# Patient Record
Sex: FEMALE | Race: WHITE | Employment: FULL TIME | URBAN - METROPOLITAN AREA
[De-identification: names, ages, dates, MRNs, and addresses within clinical notes are randomized per-mention and may not be internally consistent; named-entity substitution may affect disease eponyms.]

---

## 2018-04-02 ENCOUNTER — APPOINTMENT (OUTPATIENT)
Dept: GENERAL RADIOLOGY | Age: 58
End: 2018-04-02
Payer: COMMERCIAL

## 2018-04-02 ENCOUNTER — HOSPITAL ENCOUNTER (EMERGENCY)
Age: 58
Discharge: HOME | End: 2018-04-02
Attending: EMERGENCY MEDICINE
Payer: COMMERCIAL

## 2018-04-02 VITALS
TEMPERATURE: 98.2 F | RESPIRATION RATE: 16 BRPM | BODY MASS INDEX: 34.36 KG/M2 | HEART RATE: 105 BPM | SYSTOLIC BLOOD PRESSURE: 133 MMHG | OXYGEN SATURATION: 95 % | DIASTOLIC BLOOD PRESSURE: 77 MMHG | WEIGHT: 240 LBS | HEIGHT: 70 IN

## 2018-04-02 DIAGNOSIS — I15.9 SECONDARY HYPERTENSION: Primary | ICD-10-CM

## 2018-04-02 LAB
ANION GAP SERPL CALCULATED.3IONS-SCNC: 10 MMOL/L (ref 7–16)
BASOPHILS ABSOLUTE: 0.04 E9/L (ref 0–0.2)
BASOPHILS RELATIVE PERCENT: 0.5 % (ref 0–2)
BUN BLDV-MCNC: 15 MG/DL (ref 6–20)
CALCIUM SERPL-MCNC: 9.8 MG/DL (ref 8.6–10.2)
CHLORIDE BLD-SCNC: 102 MMOL/L (ref 98–107)
CO2: 28 MMOL/L (ref 22–29)
CREAT SERPL-MCNC: 0.8 MG/DL (ref 0.5–1)
EKG ATRIAL RATE: 71 BPM
EKG P AXIS: 49 DEGREES
EKG P-R INTERVAL: 146 MS
EKG Q-T INTERVAL: 388 MS
EKG QRS DURATION: 92 MS
EKG QTC CALCULATION (BAZETT): 421 MS
EKG R AXIS: -14 DEGREES
EKG T AXIS: 13 DEGREES
EKG VENTRICULAR RATE: 71 BPM
EOSINOPHILS ABSOLUTE: 0.21 E9/L (ref 0.05–0.5)
EOSINOPHILS RELATIVE PERCENT: 2.7 % (ref 0–6)
GFR AFRICAN AMERICAN: >60
GFR NON-AFRICAN AMERICAN: >60 ML/MIN/1.73
GLUCOSE BLD-MCNC: 121 MG/DL (ref 74–109)
HCT VFR BLD CALC: 42.5 % (ref 34–48)
HEMOGLOBIN: 13.8 G/DL (ref 11.5–15.5)
IMMATURE GRANULOCYTES #: 0.02 E9/L
IMMATURE GRANULOCYTES %: 0.3 % (ref 0–5)
LYMPHOCYTES ABSOLUTE: 1.52 E9/L (ref 1.5–4)
LYMPHOCYTES RELATIVE PERCENT: 19.7 % (ref 20–42)
MCH RBC QN AUTO: 27.2 PG (ref 26–35)
MCHC RBC AUTO-ENTMCNC: 32.5 % (ref 32–34.5)
MCV RBC AUTO: 83.7 FL (ref 80–99.9)
MONOCYTES ABSOLUTE: 0.39 E9/L (ref 0.1–0.95)
MONOCYTES RELATIVE PERCENT: 5 % (ref 2–12)
NEUTROPHILS ABSOLUTE: 5.55 E9/L (ref 1.8–7.3)
NEUTROPHILS RELATIVE PERCENT: 71.8 % (ref 43–80)
PDW BLD-RTO: 12.7 FL (ref 11.5–15)
PLATELET # BLD: 337 E9/L (ref 130–450)
PMV BLD AUTO: 10.5 FL (ref 7–12)
POTASSIUM SERPL-SCNC: 4 MMOL/L (ref 3.5–5)
RBC # BLD: 5.08 E12/L (ref 3.5–5.5)
SODIUM BLD-SCNC: 140 MMOL/L (ref 132–146)
TROPONIN: <0.01 NG/ML (ref 0–0.03)
WBC # BLD: 7.7 E9/L (ref 4.5–11.5)

## 2018-04-02 PROCEDURE — 84484 ASSAY OF TROPONIN QUANT: CPT

## 2018-04-02 PROCEDURE — 80048 BASIC METABOLIC PNL TOTAL CA: CPT

## 2018-04-02 PROCEDURE — 93005 ELECTROCARDIOGRAM TRACING: CPT | Performed by: NURSE PRACTITIONER

## 2018-04-02 PROCEDURE — 85025 COMPLETE CBC W/AUTO DIFF WBC: CPT

## 2018-04-02 PROCEDURE — 71045 X-RAY EXAM CHEST 1 VIEW: CPT

## 2018-04-02 PROCEDURE — 99284 EMERGENCY DEPT VISIT MOD MDM: CPT

## 2018-04-02 RX ORDER — PROPRANOLOL HYDROCHLORIDE 40 MG/1
40 TABLET ORAL DAILY
COMMUNITY

## 2018-04-02 RX ORDER — VALSARTAN AND HYDROCHLOROTHIAZIDE 80; 12.5 MG/1; MG/1
1 TABLET, FILM COATED ORAL DAILY
COMMUNITY

## 2018-04-02 RX ORDER — HYDRALAZINE HYDROCHLORIDE 20 MG/ML
10 INJECTION INTRAMUSCULAR; INTRAVENOUS ONCE
Status: DISCONTINUED | OUTPATIENT
Start: 2018-04-02 | End: 2018-04-03 | Stop reason: HOSPADM

## 2018-04-02 ASSESSMENT — ENCOUNTER SYMPTOMS
ABDOMINAL PAIN: 0
CHEST TIGHTNESS: 0
EYE PAIN: 0
SORE THROAT: 0
NAUSEA: 0
SHORTNESS OF BREATH: 0
ABDOMINAL DISTENTION: 0
EYE REDNESS: 0
TROUBLE SWALLOWING: 0
PHOTOPHOBIA: 0
DIARRHEA: 0
RHINORRHEA: 1

## 2020-10-23 ENCOUNTER — HOSPITAL ENCOUNTER (OUTPATIENT)
Dept: MAMMOGRAPHY | Age: 60
Discharge: HOME | End: 2020-10-25
Payer: COMMERCIAL

## 2020-10-23 PROCEDURE — 77063 BREAST TOMOSYNTHESIS BI: CPT

## 2021-11-08 ENCOUNTER — HOSPITAL ENCOUNTER (OUTPATIENT)
Dept: MAMMOGRAPHY | Age: 61
Discharge: HOME | End: 2021-11-10
Payer: COMMERCIAL

## 2021-11-08 DIAGNOSIS — Z12.31 ENCOUNTER FOR SCREENING MAMMOGRAM FOR MALIGNANT NEOPLASM OF BREAST: ICD-10-CM

## 2021-11-08 PROCEDURE — 77063 BREAST TOMOSYNTHESIS BI: CPT

## 2021-11-24 ENCOUNTER — HOSPITAL ENCOUNTER (OUTPATIENT)
Dept: GENERAL RADIOLOGY | Age: 61
Discharge: HOME | End: 2021-11-26
Payer: COMMERCIAL

## 2021-11-24 DIAGNOSIS — N64.89 BREAST ASYMMETRY: ICD-10-CM

## 2021-11-24 DIAGNOSIS — R92.8 ABNORMAL MAMMOGRAM: ICD-10-CM

## 2021-11-24 PROCEDURE — 76642 ULTRASOUND BREAST LIMITED: CPT

## 2021-11-24 PROCEDURE — G0279 TOMOSYNTHESIS, MAMMO: HCPCS

## 2022-03-16 ENCOUNTER — CLINICAL DOCUMENTATION (OUTPATIENT)
Dept: GENERAL RADIOLOGY | Age: 62
End: 2022-03-16

## 2022-03-16 NOTE — PROGRESS NOTES
Authorization for breast MRI (CPT 65553) has been obtained from [facility]. Authorization # [id number], valid until 5-8-22.

## 2022-03-17 DIAGNOSIS — Z91.89 AT HIGH RISK FOR BREAST CANCER: Primary | ICD-10-CM

## 2022-03-31 ENCOUNTER — TELEPHONE (OUTPATIENT)
Dept: GENERAL RADIOLOGY | Age: 62
End: 2022-03-31

## 2022-04-08 ENCOUNTER — HOSPITAL ENCOUNTER (OUTPATIENT)
Age: 62
Discharge: HOME | End: 2022-04-08
Payer: COMMERCIAL

## 2022-04-08 DIAGNOSIS — Z91.89 AT HIGH RISK FOR BREAST CANCER: ICD-10-CM

## 2022-04-08 LAB
ANION GAP SERPL CALCULATED.3IONS-SCNC: 13 MMOL/L (ref 7–16)
BUN BLDV-MCNC: 19 MG/DL (ref 6–20)
CALCIUM SERPL-MCNC: 9.2 MG/DL (ref 8.6–10.2)
CHLORIDE BLD-SCNC: 102 MMOL/L (ref 98–107)
CO2: 23 MMOL/L (ref 22–29)
CREAT SERPL-MCNC: 0.9 MG/DL (ref 0.5–1)
GFR AFRICAN AMERICAN: >60
GFR NON-AFRICAN AMERICAN: >60 ML/MIN/1.73
GLUCOSE BLD-MCNC: 114 MG/DL (ref 74–99)
POTASSIUM SERPL-SCNC: 3.9 MMOL/L (ref 3.5–5)
SODIUM BLD-SCNC: 138 MMOL/L (ref 132–146)

## 2022-04-08 PROCEDURE — 36415 COLL VENOUS BLD VENIPUNCTURE: CPT

## 2022-04-08 PROCEDURE — 80048 BASIC METABOLIC PNL TOTAL CA: CPT

## 2022-04-26 ENCOUNTER — HOSPITAL ENCOUNTER (OUTPATIENT)
Dept: MRI IMAGING | Age: 62
Discharge: HOME | End: 2022-04-28
Payer: COMMERCIAL

## 2022-04-26 DIAGNOSIS — Z80.3 FAMILY HISTORY OF MALIGNANT NEOPLASM OF BREAST: ICD-10-CM

## 2022-04-26 PROCEDURE — 6360000004 HC RX CONTRAST MEDICATION: Performed by: RADIOLOGY

## 2022-04-26 PROCEDURE — 77049 MRI BREAST C-+ W/CAD BI: CPT

## 2022-04-26 PROCEDURE — A9585 GADOBUTROL INJECTION: HCPCS | Performed by: RADIOLOGY

## 2022-04-26 RX ADMIN — GADOBUTROL 10 ML: 604.72 INJECTION INTRAVENOUS at 13:04

## 2022-05-11 ENCOUNTER — HOSPITAL ENCOUNTER (OUTPATIENT)
Dept: GENERAL RADIOLOGY | Age: 62
Discharge: HOME | End: 2022-05-13
Payer: COMMERCIAL

## 2022-05-11 ENCOUNTER — HOSPITAL ENCOUNTER (OUTPATIENT)
Dept: MRI IMAGING | Age: 62
Discharge: HOME | End: 2022-05-13
Payer: COMMERCIAL

## 2022-05-11 VITALS — HEIGHT: 70 IN | WEIGHT: 250 LBS | BODY MASS INDEX: 35.79 KG/M2

## 2022-05-11 DIAGNOSIS — Z98.890 STATUS POST BIOPSY: ICD-10-CM

## 2022-05-11 DIAGNOSIS — R92.8 ABNORMAL FINDING ON BREAST IMAGING: ICD-10-CM

## 2022-05-11 PROCEDURE — 88305 TISSUE EXAM BY PATHOLOGIST: CPT

## 2022-05-11 PROCEDURE — 6360000004 HC RX CONTRAST MEDICATION: Performed by: RADIOLOGY

## 2022-05-11 PROCEDURE — 2500000003 HC RX 250 WO HCPCS

## 2022-05-11 PROCEDURE — A9585 GADOBUTROL INJECTION: HCPCS | Performed by: RADIOLOGY

## 2022-05-11 PROCEDURE — 77065 DX MAMMO INCL CAD UNI: CPT

## 2022-05-11 PROCEDURE — 2720000010 MRI BIOPSY BREAST W LOC DEVICE RIGHT 1ST LESION

## 2022-05-11 RX ADMIN — GADOBUTROL 10 ML: 604.72 INJECTION INTRAVENOUS at 13:44

## 2022-05-17 ENCOUNTER — TELEPHONE (OUTPATIENT)
Dept: GENERAL RADIOLOGY | Age: 62
End: 2022-05-17

## 2022-05-17 NOTE — TELEPHONE ENCOUNTER
Per Valeria Protocol, patient was asked prior to biopsy how she would like notified of her breast biopsy results and she elected telephone call from breast navigator. Call to patient today to instruct her that the pathology report from her recent right breast biopsy indicates  Flat epithelial atypia and intraductal papilloma. Instructed that while both are benign, FEA is considered a high risk lesion. Surgical consultation is recommended. Norma Nevarez indicates that she is currently in [place]. She will speak with her physician about whether or not surgical consult can wait until the Fall when she returns to [place]. Instructed to  follow her physician's recommendations for any follow up care. Verbalizes understanding. Pathology report faxed to Dr. Solorio McMinn office.   Electronically signed by Nydia Hudson RN, BSN on 5/17/2022 at 2:36 PM

## 2022-05-19 ENCOUNTER — TELEPHONE (OUTPATIENT)
Dept: BREAST CENTER | Age: 62
End: 2022-05-19

## 2022-05-19 NOTE — TELEPHONE ENCOUNTER
Patient is a new referral to the breast clinic. Left message for patient to return call at [phone] to discuss referral information and schedule an appointment in the breast clinic.       Electronically signed by Storm Aguirre RN on 5/19/22 at 11:18 AM EDT

## 2022-05-24 ENCOUNTER — TELEPHONE (OUTPATIENT)
Dept: BREAST CENTER | Age: 62
End: 2022-05-24

## 2022-05-24 NOTE — TELEPHONE ENCOUNTER
Patient was referred by Krystal Calle for right breast focal flat epithelial atypia intraductal papilloma. Patient was scheduled on 06/14/2022 in [facility] office @ 2:30pm with Dr. Mi Sanches. Patient was instructed to bring a photo ID, insurance card (if applicable), and list of any current medications. Patient verbalized understanding of appointment instructions.         Electronically signed by Thanh Goodrich RN on 5/24/22 at 8:40 AM EDT

## 2022-06-14 ENCOUNTER — OFFICE VISIT (OUTPATIENT)
Dept: BREAST CENTER | Age: 62
End: 2022-06-14
Payer: COMMERCIAL

## 2022-06-14 VITALS
DIASTOLIC BLOOD PRESSURE: 86 MMHG | RESPIRATION RATE: 18 BRPM | HEART RATE: 68 BPM | TEMPERATURE: 97.9 F | WEIGHT: 252.2 LBS | OXYGEN SATURATION: 100 % | SYSTOLIC BLOOD PRESSURE: 158 MMHG | BODY MASS INDEX: 36.11 KG/M2 | HEIGHT: 70 IN

## 2022-06-14 DIAGNOSIS — R92.8 ABNORMAL MRI, BREAST: ICD-10-CM

## 2022-06-14 PROCEDURE — 99203 OFFICE O/P NEW LOW 30 MIN: CPT | Performed by: SURGERY

## 2022-06-14 PROCEDURE — 99203 OFFICE O/P NEW LOW 30 MIN: CPT

## 2022-06-14 RX ORDER — LOSARTAN POTASSIUM AND HYDROCHLOROTHIAZIDE 25; 100 MG/1; MG/1
TABLET ORAL
COMMUNITY
Start: 2022-06-13

## 2022-06-14 RX ORDER — ERGOCALCIFEROL 1.25 MG/1
CAPSULE ORAL
COMMUNITY
Start: 2022-05-02

## 2022-06-14 NOTE — PROGRESS NOTES
Date of Visit: 6/14/2022  New Patient    06/14/22      DIAGNOSIS:  1. (06/14/22) RIGHT (RA) papilloma with FEA  2. Family history of breast cancer  * Mother  * Paternal aunt    IMAGING/PROCEDURES:  1. (11/08/21) BILATERAL s-mammogram: BIRADS-0  * RIGHT (central asymmetry)  2. (11/24/21) RIGHT d-mammogram and US: BIRADS-2  * Asymmetry persists on mammo  * US shows two small hypoechoic areas largest 4mm possible cysts  3. (04/26/22) MRI: BIRADS-4  * RIGHT (RA) 1cm NMLE  4. (05/11/22) RIGHT MRI biopsy    HISTORY OF PRESENT ILLNESS  Miquel Alicia was in the office today for her consultation regarding her recent right breast MRI guided biopsy. Prakash Almonte underwent her routine imaging in late 2021. There were no concerning findings. She was seen by one of her healthcare providers where her family history of breast cancer was noted. It breast cancer risk calculator, the jamie model, was performed and demonstrated a lifetime breast cancer risk of 29%. Based on this the patient underwent a breast MRI. The MRI showed 1 cm of non-mass-like linear enhancement in the right retroareolar location. Image guided biopsy was performed and demonstrated notable findings of flat epithelial atypia as well as a papilloma. The patient is in the office now to discuss the above and receive any additional recommendations. BREAST SYMPTOMS  Prakash Almonte has no symptoms to report. Specifically, she has no palpable masses. She has had no nipple discharge or retraction. She has had no skin retraction or discoloration. PAST BREAST HISTORY  Prakash Almonte has undergone no prior biopsies and has had no breast surgeries. BREAST CANCER RISK FACTORS  The patient does have a family history of breast cancer. Notably, her mother was diagnosed with breast cancer at the age of 61. A paternal aunt was diagnosed in her 76s. She also believes that there is a maternal second cousin who was also diagnosed with breast cancer likely in the postmenopausal years.     PAST MEDICAL/SURGICAL HISTORY  Past Medical History:   Diagnosis Date    HTN (hypertension)      Past Surgical History:   Procedure Laterality Date    MRI BREAST BX USING DEVICE RIGHT Right 5/11/2022    MRI BREAST BX USING DEVICE RIGHT 5/11/2022 [facility] MRI       MEDICATIONS    Current Outpatient Medications:     vitamin D (ERGOCALCIFEROL) 1.25 MG (75593 UT) CAPS capsule, , Disp: , Rfl:     losartan-hydroCHLOROthiazide (HYZAAR) 100-25 MG per tablet, , Disp: , Rfl:     propranolol (INDERAL) 40 MG tablet, Take 40 mg by mouth daily, Disp: , Rfl:     valsartan-hydrochlorothiazide (DIOVAN HCT) 80-12.5 MG per tablet, Take 1 tablet by mouth daily, Disp: , Rfl:     ALLERGIES  Allergies   Allergen Reactions    Amoxicillin        SOCIAL HISTORY   reports that she has never smoked. She has never used smokeless tobacco. She reports that she does not drink alcohol and does not use drugs. PHYSICAL EXAMINATION  BP (!) 158/86 (Site: Right Upper Arm)   Pulse 68   Temp 97.9 °F (36.6 °C)   Resp 18   Ht 5' 10\" (1.778 m)   Wt 252 lb 3.2 oz (114.4 kg)   SpO2 100%   BMI 36.19 kg/m²     COMPREHENSIVE BREAST EXAMINATION  There are no cervical, supraclavicular, or infraclavicular lymph nodes that are palpable. Inspection of the breast bilaterally demonstrate there to be no secondary signs of malignancy. There are no lesions of the nipple areola on either side. No spontaneous discharges with this. Palpation to the axilla bilaterally is without adenopathy. Palpation of the breast demonstrates no masses. BREAST IMAGING STUDIES  I did review the imaging studies. The key findings are from the mammogram and ultrasound in November 2 small benign-appearing hypoechoic retroareolar irregularities. The MRI shows somewhat linear non-mass-like enhancement in the right retroareolar location. PATHOLOGY  I did review the recent biopsy results which demonstrated flat epithelial atypia and a papilloma.     ASSESSMENT AND RONY Aguirre was in the office today for her consultation regarding her recent right breast biopsy. I had a discussion today with Emmanuel Severino regarding the diagnosis as well as recommended treatment options. I did spend 30 minutes on the visit over half of which was dedicated to education counseling and decision making. Some of this time does include previsit chart review. With respect to her case specifically I first assured her that neither the flat epithelial atypia nor the papilloma are malignant nor should they be considered premalignant. We went on to discuss the concept of sampling limitation. I informed her that while image guided biopsy is very accurate there are certain cases where there is some uncertainty. Both the flat epithelial atypia and the papilloma fell into this category. I shared with her that in prior years risk of finding an occult malignancy with a follow-up lumpectomy was as high as 20% with either of these diagnoses. However, more recent studies in the area of better imaging showed that the risk of finding an occult malignancy with flat epithelial atypia is likely in the mid single digits. Likewise for a papilloma without atypia or symptoms. Today in the office therefore we discussed the options of short interval follow-up versus excision. With my full support and in fact encouragement she chose for surveillance. The patient will go undergo diagnostic imaging in October 2022. She will undergo a follow-up breast MRI and see us in the office in April 2023. Because now if the flat epithelial atypia she likely will be committed to annual breast MRI for surveillance. In the interim we certainly asked the patient to perform self examinations and contact us should there be any changes. This note was created with voice recognition software.   Please excuse any grammatical errors that were not corrected and please contact me if there are any questions. [email or web address]  [phone]

## 2022-06-15 DIAGNOSIS — D24.1 PAPILLOMA OF RIGHT BREAST: Primary | ICD-10-CM

## 2022-10-27 ENCOUNTER — HOSPITAL ENCOUNTER (OUTPATIENT)
Dept: GENERAL RADIOLOGY | Age: 62
Discharge: HOME | End: 2022-10-29
Payer: COMMERCIAL

## 2022-10-27 DIAGNOSIS — D24.1 PAPILLOMA OF RIGHT BREAST: ICD-10-CM

## 2022-10-27 PROCEDURE — G0279 TOMOSYNTHESIS, MAMMO: HCPCS

## 2022-10-28 DIAGNOSIS — D24.1 INTRADUCTAL PAPILLOMA OF BREAST, RIGHT: Primary | ICD-10-CM

## 2023-03-02 ENCOUNTER — TELEPHONE (OUTPATIENT)
Dept: BREAST CENTER | Age: 63
End: 2023-03-02

## 2023-03-02 DIAGNOSIS — Z01.818 PREOP TESTING: Primary | ICD-10-CM

## 2023-03-02 NOTE — TELEPHONE ENCOUNTER
RN contacted patient to verify information to move forward with patient MRI. Patient verified that insurance in computer is correct to move forward with authorization. Patient reminded patient to have lab work drawn at [facility] location. RN notified patient that once office receives authorization for MRI to be scheduled order will be given to [address] [place] [address]  and they will contact her to schedule the MRI. Patient notified once MRI scheduled to contact office and schedule follow up appointment. Patient verbalized understanding.            Electronically signed by Jian Lopez RN on 3/2/23 at 10:34 AM EST

## 2023-03-02 NOTE — TELEPHONE ENCOUNTER
----- Message from Melissa Montejo RN sent at 8/17/2022  2:10 PM EDT -----  Patient had diagnostic work up in Oct 2022. Patient needs to have MRI performed then follow up with Sheldon Rico 04/2023. Patient last seen 06/14/2022.     Electronically signed by Melissa Montejo RN on 8/17/22 at 2:11 PM EDT

## 2023-03-31 ENCOUNTER — HOSPITAL ENCOUNTER (OUTPATIENT)
Age: 63
Discharge: HOME | End: 2023-03-31
Payer: COMMERCIAL

## 2023-03-31 DIAGNOSIS — Z01.818 PREOP TESTING: ICD-10-CM

## 2023-03-31 LAB
BUN SERPL-MCNC: 18 MG/DL (ref 6–23)
CREAT SERPL-MCNC: 0.9 MG/DL (ref 0.5–1)

## 2023-03-31 PROCEDURE — 82565 ASSAY OF CREATININE: CPT

## 2023-03-31 PROCEDURE — 84520 ASSAY OF UREA NITROGEN: CPT

## 2023-03-31 PROCEDURE — 36415 COLL VENOUS BLD VENIPUNCTURE: CPT

## 2023-05-09 ENCOUNTER — HOSPITAL ENCOUNTER (OUTPATIENT)
Dept: MRI IMAGING | Age: 63
Discharge: HOME | End: 2023-05-11
Payer: COMMERCIAL

## 2023-05-09 DIAGNOSIS — D24.1 PAPILLOMA OF RIGHT BREAST: ICD-10-CM

## 2023-05-09 PROCEDURE — A9585 GADOBUTROL INJECTION: HCPCS | Performed by: RADIOLOGY

## 2023-05-09 PROCEDURE — 6360000004 HC RX CONTRAST MEDICATION: Performed by: RADIOLOGY

## 2023-05-09 PROCEDURE — C8908 MRI W/O FOL W/CONT, BREAST,: HCPCS

## 2023-05-09 RX ADMIN — GADOBUTROL 10 ML: 604.72 INJECTION INTRAVENOUS at 13:44

## 2023-05-10 ENCOUNTER — OFFICE VISIT (OUTPATIENT)
Dept: BREAST CENTER | Age: 63
End: 2023-05-10
Payer: COMMERCIAL

## 2023-05-10 VITALS
HEART RATE: 61 BPM | BODY MASS INDEX: 37.22 KG/M2 | WEIGHT: 260 LBS | HEIGHT: 70 IN | DIASTOLIC BLOOD PRESSURE: 88 MMHG | SYSTOLIC BLOOD PRESSURE: 136 MMHG | OXYGEN SATURATION: 99 % | RESPIRATION RATE: 14 BRPM | TEMPERATURE: 98.5 F

## 2023-05-10 DIAGNOSIS — D24.1 INTRADUCTAL PAPILLOMA OF BREAST, RIGHT: Primary | ICD-10-CM

## 2023-05-10 PROCEDURE — G8427 DOCREV CUR MEDS BY ELIG CLIN: HCPCS | Performed by: SURGERY

## 2023-05-10 PROCEDURE — 1036F TOBACCO NON-USER: CPT | Performed by: SURGERY

## 2023-05-10 PROCEDURE — 99213 OFFICE O/P EST LOW 20 MIN: CPT | Performed by: SURGERY

## 2023-05-10 PROCEDURE — 3017F COLORECTAL CA SCREEN DOC REV: CPT | Performed by: SURGERY

## 2023-05-10 PROCEDURE — G8417 CALC BMI ABV UP PARAM F/U: HCPCS | Performed by: SURGERY

## 2023-05-10 NOTE — PROGRESS NOTES
Date of visit: 5/10/2023    06/14/22  05/10/23      DIAGNOSIS:  1. (06/14/22) RIGHT (RA) papilloma with FEA-unexcised  2. Family history of breast cancer  * Mother  * Paternal aunt    IMAGING/PROCEDURES:  1. (11/08/21) BILATERAL s-mammogram: BIRADS-0  * RIGHT (central asymmetry)  2. (11/24/21) RIGHT d-mammogram and US: BIRADS-2  * Asymmetry persists on mammo  * US shows two small hypoechoic areas largest 4mm possible cysts  3. (04/26/22) MRI: BIRADS-4  * RIGHT (RA) 1cm NMLE  4. (05/11/22) RIGHT MRI biopsy  5. (10/27/22) BILATERAL dt-mammogram: BIRADS-2    Breast History  Devon Connolly was in the office for follow-up regarding history of benign excised papilloma with flat epithelial atypia. Dany Oconnor underwent right breast image guided biopsy of non-mass-like enhancement in the right breast 1 year ago. The pathology report demonstrated a papilloma with flat epithelial atypia. We discussed the issue of sampling limitation associated with this irregularity. I estimated an approximately 5 to 7% risk of an undiagnosed cancer that may be diagnosed with lumpectomy. I provided her with the option of excision versus surveillance. With my full support she opted for the latter. Breast Symptoms  Dany Oconnor has no symptoms to report. Breast Examination  A comprehensive examination demonstrates no focal findings in either breast or axilla. Breast Imaging  Alcira underwent a breast MRI yesterday. This was reported as negative. I did review the study and see no suspicious findings. I also did review the mammogram from October again. There is a clip present in the right retroareolar location. It does not appear to be associated with any suspicious findings. Assessment/Plan  Devon Connolly is in the office for follow-up. I informed Dany Oconnor that based on the mammogram MRI and today's exam there is no sign of worrisome findings. I shared with her my preference for her 2 years of surveillance.   She will be due for a mammogram in

## 2023-10-20 DIAGNOSIS — Z12.31 VISIT FOR SCREENING MAMMOGRAM: Primary | ICD-10-CM

## 2023-11-02 ENCOUNTER — HOSPITAL ENCOUNTER (OUTPATIENT)
Dept: GENERAL RADIOLOGY | Age: 63
Discharge: HOME | End: 2023-11-04
Payer: COMMERCIAL

## 2023-11-02 VITALS — WEIGHT: 250 LBS | BODY MASS INDEX: 35.87 KG/M2

## 2023-11-02 DIAGNOSIS — Z12.31 VISIT FOR SCREENING MAMMOGRAM: ICD-10-CM

## 2023-11-02 PROCEDURE — 77063 BREAST TOMOSYNTHESIS BI: CPT

## 2023-11-03 ENCOUNTER — TELEPHONE (OUTPATIENT)
Dept: GENERAL RADIOLOGY | Age: 63
End: 2023-11-03

## 2023-11-03 DIAGNOSIS — R92.8 ABNORMAL MAMMOGRAM: Primary | ICD-10-CM

## 2023-11-03 NOTE — TELEPHONE ENCOUNTER
Call to patient in reference to her mammogram performed at [facility] on November 2, 2023. Instructed patient that the radiologist has recommended some additional breast imaging, in order to make a final determination/result. Verbalizes understanding and is agreeable to proceed. Appointment provided.

## 2023-11-16 ENCOUNTER — HOSPITAL ENCOUNTER (OUTPATIENT)
Dept: GENERAL RADIOLOGY | Age: 63
Discharge: HOME | End: 2023-11-18
Payer: COMMERCIAL

## 2023-11-16 DIAGNOSIS — R92.8 ABNORMAL MAMMOGRAM: ICD-10-CM

## 2023-11-16 PROCEDURE — 76642 ULTRASOUND BREAST LIMITED: CPT

## 2023-11-16 PROCEDURE — G0279 TOMOSYNTHESIS, MAMMO: HCPCS

## 2024-01-18 ENCOUNTER — TELEPHONE (OUTPATIENT)
Dept: BREAST CENTER | Age: 64
End: 2024-01-18

## 2024-01-18 NOTE — TELEPHONE ENCOUNTER
RN received a call from patient in regards to right axilla tenderness. Patient stated she noticed yesterday when was showering that when she passed over her right axilla it was tender. Patient states it was  this morning but now has subsided. Patient denies any lumps. Patient doesn't recall doing anything to the area. Patient had a cold several weeks ago and was moving furniture a week ago but this just started yesterday so wanted to report it to the office to see if there is anything she needed to worry about as that is the same side that is being monitor by . RN advised would notify  of information and if was anything needed to advise her on would get a return call from office.       Patient can be reached at [phone].      Electronically signed by Deisy Villa RN on 1/18/24 at 10:06 AM EST

## 2024-04-17 ENCOUNTER — TELEPHONE (OUTPATIENT)
Dept: BREAST CENTER | Age: 64
End: 2024-04-17

## 2024-04-17 DIAGNOSIS — D24.1 INTRADUCTAL PAPILLOMA OF BREAST, RIGHT: Primary | ICD-10-CM

## 2024-04-17 DIAGNOSIS — R92.8 ABNORMAL MRI, BREAST: ICD-10-CM

## 2024-04-17 NOTE — TELEPHONE ENCOUNTER
RN received call from patient to move forward with breast MRI. Patient verified that insurance in computer is correct to move forward with authorization. RN reminded patient to have lab work drawn at a [facility] location.RN notified patient that once office receives authorization for MRI to be scheduled order will be given to [facility] schedulers and they will contact her to schedule the MRI. Patient notified once MRI scheduled to contact office and schedule follow up appointment. Patient verbalized understanding.           Electronically signed by Deisy Villa RN on 4/17/24 at 9:04 AM EDT

## 2024-04-17 NOTE — TELEPHONE ENCOUNTER
Authorization has been obtained for breast MRI  [id number] - Approval # [id number] valid 4/17/24 - 10/14/24 -- spoke with Vonda Peña / [employer] insurance - Case# [id number]

## 2024-05-02 ENCOUNTER — HOSPITAL ENCOUNTER (OUTPATIENT)
Age: 64
Discharge: HOME | End: 2024-05-02
Payer: COMMERCIAL

## 2024-05-02 DIAGNOSIS — D24.1 INTRADUCTAL PAPILLOMA OF BREAST, RIGHT: ICD-10-CM

## 2024-05-02 DIAGNOSIS — R92.8 ABNORMAL MRI, BREAST: ICD-10-CM

## 2024-05-02 LAB
BUN SERPL-MCNC: 19 MG/DL (ref 6–23)
CREAT SERPL-MCNC: 0.9 MG/DL (ref 0.5–1)
GFR, ESTIMATED: 75 ML/MIN/1.73M2

## 2024-05-02 PROCEDURE — 36415 COLL VENOUS BLD VENIPUNCTURE: CPT

## 2024-05-02 PROCEDURE — 82565 ASSAY OF CREATININE: CPT

## 2024-05-02 PROCEDURE — 84520 ASSAY OF UREA NITROGEN: CPT

## 2024-05-07 ENCOUNTER — HOSPITAL ENCOUNTER (OUTPATIENT)
Dept: MRI IMAGING | Age: 64
Discharge: HOME | End: 2024-05-09
Payer: COMMERCIAL

## 2024-05-07 DIAGNOSIS — R92.8 ABNORMAL MRI, BREAST: ICD-10-CM

## 2024-05-07 DIAGNOSIS — D24.1 INTRADUCTAL PAPILLOMA OF BREAST, RIGHT: ICD-10-CM

## 2024-05-07 PROCEDURE — C8908 MRI W/O FOL W/CONT, BREAST,: HCPCS

## 2024-05-07 PROCEDURE — 6360000004 HC RX CONTRAST MEDICATION: Performed by: RADIOLOGY

## 2024-05-07 PROCEDURE — A9585 GADOBUTROL INJECTION: HCPCS | Performed by: RADIOLOGY

## 2024-05-07 RX ORDER — GADOBUTROL 604.72 MG/ML
10 INJECTION INTRAVENOUS
Status: COMPLETED | OUTPATIENT
Start: 2024-05-07 | End: 2024-05-07

## 2024-05-07 RX ADMIN — GADOBUTROL 10 ML: 604.72 INJECTION INTRAVENOUS at 07:36

## 2024-05-10 ENCOUNTER — HOSPITAL ENCOUNTER (OUTPATIENT)
Age: 64
Discharge: HOME | End: 2024-05-10
Payer: COMMERCIAL

## 2024-05-10 LAB
ALBUMIN SERPL-MCNC: 4.4 G/DL (ref 3.5–5.2)
ALP SERPL-CCNC: 97 U/L (ref 35–104)
ALT SERPL-CCNC: 28 U/L (ref 0–32)
ANION GAP SERPL CALCULATED.3IONS-SCNC: 8 MMOL/L (ref 7–16)
AST SERPL-CCNC: 19 U/L (ref 0–31)
BILIRUB SERPL-MCNC: 0.8 MG/DL (ref 0–1.2)
BUN SERPL-MCNC: 20 MG/DL (ref 6–23)
CALCIUM SERPL-MCNC: 9.5 MG/DL (ref 8.6–10.2)
CHLORIDE SERPL-SCNC: 104 MMOL/L (ref 98–107)
CO2 SERPL-SCNC: 28 MMOL/L (ref 22–29)
CREAT SERPL-MCNC: 1 MG/DL (ref 0.5–1)
GFR, ESTIMATED: 66 ML/MIN/1.73M2
GLUCOSE SERPL-MCNC: 114 MG/DL (ref 74–99)
POTASSIUM SERPL-SCNC: 4.2 MMOL/L (ref 3.5–5)
PROT SERPL-MCNC: 7.5 G/DL (ref 6.4–8.3)
SODIUM SERPL-SCNC: 140 MMOL/L (ref 132–146)

## 2024-05-10 PROCEDURE — 80053 COMPREHEN METABOLIC PANEL: CPT

## 2024-05-10 PROCEDURE — 36415 COLL VENOUS BLD VENIPUNCTURE: CPT

## 2024-05-14 ENCOUNTER — TELEPHONE (OUTPATIENT)
Dept: GENERAL RADIOLOGY | Age: 64
End: 2024-05-14

## 2024-05-14 NOTE — TELEPHONE ENCOUNTER
Returned patient's call to advise of her bilateral breast MRI results from 05/07/24 now being completed with a preliminary result, that she should be able to see this in her MyChart, and  advised of \"Benign findings. Normal interval follow up is recommended in 12 months\". Understanding verbalized.

## 2024-06-03 ENCOUNTER — OFFICE VISIT (OUTPATIENT)
Dept: BREAST CENTER | Age: 64
End: 2024-06-03
Payer: COMMERCIAL

## 2024-06-03 VITALS
WEIGHT: 226 LBS | HEART RATE: 69 BPM | BODY MASS INDEX: 32.35 KG/M2 | OXYGEN SATURATION: 98 % | HEIGHT: 70 IN | TEMPERATURE: 97.8 F | RESPIRATION RATE: 20 BRPM | SYSTOLIC BLOOD PRESSURE: 128 MMHG | DIASTOLIC BLOOD PRESSURE: 68 MMHG

## 2024-06-03 DIAGNOSIS — D24.1 PAPILLOMA OF RIGHT BREAST: Primary | ICD-10-CM

## 2024-06-03 PROCEDURE — 99213 OFFICE O/P EST LOW 20 MIN: CPT | Performed by: SURGERY

## 2024-06-03 PROCEDURE — 99214 OFFICE O/P EST MOD 30 MIN: CPT | Performed by: SURGERY

## 2024-06-03 NOTE — PROGRESS NOTES
Date of visit: 6/3/2024    06/14/22  05/10/23  06/03/24      DIAGNOSIS:  1. (06/14/22) RIGHT (RA) papilloma with FEA-unexcised  2. Family history of breast cancer  * Mother  * Paternal aunt    IMAGING/PROCEDURES:  1. (11/08/21) BILATERAL s-mammogram: BIRADS-0  * RIGHT (central asymmetry)  2. (11/24/21) RIGHT d-mammogram and US: BIRADS-2  * Asymmetry persists on mammo  * US shows two small hypoechoic areas largest 4mm possible cysts  3. (04/26/22) MRI: BIRADS-4  * RIGHT (RA) 1cm NMLE  4. (05/11/22) RIGHT MRI biopsy  5. (10/27/22) BILATERAL dt-mammogram: BIRADS-2  6. (11/02/23) BILATERAL st-mammogram: BIRADS-0  * RIGHT upper breast small mass  * RIGHT (RA) biopsy site no suspicious findings  7. (11/16/23) RIGHT dt-mammogram and US: BIRADS-2  * RIGHT (12:00) 5mm cyst  8. (05/07/24) MRI: BIRADS-2    Breast History  Alcira De La Paz was in the office today for ongoing follow-up regarding a right breast papilloma with flat epithelial atypia that is excised.    Alcira was first evaluated in June 2022.  At that time we discussed the concept of upstaging and considered either surgical excision or follow-up.  With my full support she opted for the latter.    Breast Symptoms  Alcira has no symptoms to report.  Specifically, she notes no masses.  She has no skin retraction or discoloration.  She has no nipple discharge or retraction.    Breast Examination  There are no cervical, supraclavicular, or infraclavicular lymph nodes palpable.    Inspection of the breast bilaterally demonstrate there to be no secondary signs of malignancy.  There are no lesions of the nipple or areola on either side.  No spontaneous discharges witnessed.    Palpation of the axilla bilaterally is without adenopathy.    Palpation of the breast bilaterally demonstrates there to be no masses.    Breast Imaging  Alcira underwent a screening mammogram in November 2023 had diagnostic mammogram in November 2023 and a breast MRI in May 2024.  The screening mammogram

## 2024-11-11 ENCOUNTER — HOSPITAL ENCOUNTER (OUTPATIENT)
Dept: GENERAL RADIOLOGY | Age: 64
Discharge: HOME | End: 2024-11-13
Payer: COMMERCIAL

## 2024-11-11 VITALS — HEIGHT: 70 IN | BODY MASS INDEX: 30.64 KG/M2 | WEIGHT: 214 LBS

## 2024-11-11 DIAGNOSIS — D24.1 PAPILLOMA OF RIGHT BREAST: ICD-10-CM

## 2024-11-11 PROCEDURE — 77063 BREAST TOMOSYNTHESIS BI: CPT

## 2025-04-07 ENCOUNTER — OFFICE VISIT (OUTPATIENT)
Dept: BREAST CENTER | Age: 65
End: 2025-04-07
Payer: COMMERCIAL

## 2025-04-07 VITALS
TEMPERATURE: 98.1 F | RESPIRATION RATE: 18 BRPM | HEART RATE: 78 BPM | HEIGHT: 70 IN | DIASTOLIC BLOOD PRESSURE: 80 MMHG | OXYGEN SATURATION: 96 % | SYSTOLIC BLOOD PRESSURE: 122 MMHG | WEIGHT: 214 LBS | BODY MASS INDEX: 30.64 KG/M2

## 2025-04-07 DIAGNOSIS — D36.9 INTRADUCTAL PAPILLOMA: Primary | ICD-10-CM

## 2025-04-07 PROCEDURE — 99212 OFFICE O/P EST SF 10 MIN: CPT | Performed by: SURGERY

## 2025-04-07 PROCEDURE — 99213 OFFICE O/P EST LOW 20 MIN: CPT | Performed by: SURGERY

## 2025-04-07 NOTE — PROGRESS NOTES
review these images.  The clip is noted in the right retroareolar location.  There appears to be no change in the tissue around this location and no significant findings elsewhere in either breast.    Assessment/Plan  Alcira De La Paz is in the office now completing close to 3 years of surveillance for an on excised and asymptomatic papilloma associated with flat epithelial atypia.    It is notable that the patient did have a follow-up breast MRI in 2024 which was also read as benign.  I did also review these films.  There is some persistent and unchanged enhancement in the retroareolar location.    Reviewing the patient's family history there is no overly concerning issues related to a genetic predisposition.    Based on the above I informed Alcira that at this point my recommendation is simply for annual mammography and monthly self breast examinations to the best of her ability.    [place].  I suggested that she obtain her records from our center and certainly would make myself available for any discussions with her new physicians.    I also discussed with her that as a patient begins screening at a different location there is often additional imaging just to assure a new baseline.    I did spend 15 minutes on this visit over half of which was dedicated to education counseling and decision making.  Some of this time does include documentation time as well as EMR and/or imaging review outside of the room time.       This note was created with voice recognition software.  Please excuse any grammatical errors that were not corrected and please contact me if there are any questions.    [email or web address]  [phone]